# Patient Record
Sex: FEMALE | URBAN - METROPOLITAN AREA
[De-identification: names, ages, dates, MRNs, and addresses within clinical notes are randomized per-mention and may not be internally consistent; named-entity substitution may affect disease eponyms.]

---

## 2020-01-23 ENCOUNTER — APPOINTMENT (RX ONLY)
Dept: URBAN - METROPOLITAN AREA CLINIC 77 | Facility: CLINIC | Age: 65
Setting detail: DERMATOLOGY
End: 2020-01-23

## 2020-01-23 DIAGNOSIS — L98.8 OTHER SPECIFIED DISORDERS OF THE SKIN AND SUBCUTANEOUS TISSUE: ICD-10-CM

## 2020-01-23 DIAGNOSIS — L90.8 OTHER ATROPHIC DISORDERS OF SKIN: ICD-10-CM

## 2020-01-23 PROBLEM — E86.9 VOLUME DEPLETION, UNSPECIFIED: Status: ACTIVE | Noted: 2020-01-23

## 2020-01-23 PROCEDURE — ? SCULPTRA

## 2020-01-23 PROCEDURE — ? ADDITIONAL NOTES

## 2020-01-23 NOTE — HPI: OTHER
Condition:: Facial laxity
Please Describe Your Condition:: Concerns about jowling on lower face
Condition:: Platysmal banding.
Please Describe Your Condition:: Lines on neck

## 2020-01-23 NOTE — PROCEDURE: ADDITIONAL NOTES
Detail Level: Simple
Additional Notes: Discussed plastic surgeon and rama
Additional Notes: Pt wants more juvederm in wrinkles around mouth.

## 2020-01-23 NOTE — PROCEDURE: SCULPTRA
Detail Level: Detailed
Right [facility] Filler Volume In Cc: 0
Show Map Statement?: Yes
Price (Use Numbers Only, No Special Characters Or $): 1200
Reconstitution Date: 1-7-20
Anesthesia Type: 1% lidocaine with 1:100,000 epinephrine
Show Right And Left Zygomatic Arches Volume?: No
Vials Reconstituted (Required For Inventory): 1
Map Statement: See Attached Map for Complete Details.
Post-Care Instructions: Patient instructed to apply ice to reduce swelling.
Consent: Written consent obtained. Risks include but not limited to bruising, beading, irregular texture, ulceration, infection, allergic reaction, scar formation, incomplete augmentation, temporary nature, procedural pain.
Additional Area 1 Location: infraorbital cheeks, right sub malar cheek, right subzygomatic cheek, anterior mandibles, pyriform triangles
Anesthesia Volume In Cc: 3
Additional Area 1 Volume In Cc: 9
Dilution Method: The Sculptra was diluted with 6ml of bacteriostatic sterile water and 3.0 mL of 1% Lidocaine with epinephrine 1:100,000 for a total volume of 9mL for each vial.
Topical Anesthesia?: 20% lidocaine
Volumizer: Sculptra
Treatment Number: 5
Lot #: 
Expiration Date (Month Year): 06/30/22
Injection Technique: The Sculptra was injected to the listed areas after cleansing the skin and providing appropriate anesthesia.

## 2021-02-25 ENCOUNTER — RX ONLY (OUTPATIENT)
Age: 66
Setting detail: RX ONLY
End: 2021-02-25

## 2021-03-16 ENCOUNTER — APPOINTMENT (RX ONLY)
Dept: URBAN - METROPOLITAN AREA CLINIC 77 | Facility: CLINIC | Age: 66
Setting detail: DERMATOLOGY
End: 2021-03-16

## 2021-03-16 DIAGNOSIS — L98.8 OTHER SPECIFIED DISORDERS OF THE SKIN AND SUBCUTANEOUS TISSUE: ICD-10-CM

## 2021-03-16 DIAGNOSIS — Z41.9 ENCOUNTER FOR PROCEDURE FOR PURPOSES OTHER THAN REMEDYING HEALTH STATE, UNSPECIFIED: ICD-10-CM

## 2021-03-16 PROCEDURE — ? JUVEDERM ULTRA XC INJECTION

## 2021-03-16 PROCEDURE — ? OTHER (COSMETIC)

## 2021-03-16 NOTE — PROCEDURE: OTHER (COSMETIC)
Other (Free Text): 1. Discussed [facility] and [place] laser
Detail Level: Simple
Price (Use Numbers Only, No Special Characters Or $): 0

## 2021-03-16 NOTE — PROCEDURE: JUVEDERM ULTRA XC INJECTION
Include Cannula Brand?: DermaSculpt
Use Map Statement For Sites (Optional): Yes
Include Cannula Size?: 27G
Decollete Filler Volume In Cc: 0
Additional Area 1 Volume In Cc: 2
Detail Level: Simple
Expiration Date (Month Year): 02/15/2022
Additional Area 1 Location: chin, lower lip, MLF’s, buccal cheeks
Consent: Written consent obtained. Risks include but not limited to bruising, beading, irregular texture, ulceration, infection, allergic reaction, scar formation, incomplete augmentation, temporary nature, procedural pain.
Procedural Text: The filler was administered to the treatment areas noted above.
Include Cannula Length?: 1.5 inch
Post-Care Instructions: Patient instructed to apply ice to reduce swelling. Discussed signs and symptoms of vascular compromise. Patient was instructed to call the office immediately.
Number Of Syringes (Required For Inventory): 1
Map Statment: See Attach Map for Complete Details
Include Cannula Information In Note?: No
Topical Anesthesia?: 20% lidocaine
Price (Use Numbers Only, No Special Characters Or $): 1041.00
Lot #: [id number]
Filler: Juvederm Ultra XC

## 2021-03-18 ENCOUNTER — APPOINTMENT (RX ONLY)
Dept: URBAN - METROPOLITAN AREA CLINIC 77 | Facility: CLINIC | Age: 66
Setting detail: DERMATOLOGY
End: 2021-03-18

## 2021-03-18 DIAGNOSIS — R23.3 SPONTANEOUS ECCHYMOSES: ICD-10-CM

## 2021-03-18 PROCEDURE — ? COUNSELING

## 2021-03-18 PROCEDURE — ? CYNERGY LASER

## 2021-03-18 ASSESSMENT — LOCATION SIMPLE DESCRIPTION DERM: LOCATION SIMPLE: LEFT CHEEK

## 2021-03-18 ASSESSMENT — LOCATION ZONE DERM: LOCATION ZONE: FACE

## 2021-03-18 ASSESSMENT — LOCATION DETAILED DESCRIPTION DERM: LOCATION DETAILED: LEFT INFERIOR CENTRAL MALAR CHEEK

## 2021-03-18 NOTE — PROCEDURE: CYNERGY LASER
External Cooling Fan Speed: 5
Rep Rate (Hz): single pulse
Cooling: SmartCool
Treatment Number: 1
Fluence In J/Cm2: 4
Price (Use Numbers Only, No Special Characters Or $): 0
Spot Size In Mm: 7
Spot Size In Mm: 10
External Cooling: [facility] Cryo 5
Consent: Written consent obtained, risks reviewed including but not limited to crusting, scabbing, blistering, scarring, darker or lighter pigmentary change, systemic reactions, ulceration, incidental hair removal, bruising, and/or incomplete removal.
Total Pulses: 31
Post-Care Instructions: I reviewed with the patient in detail post-care instructions. Patient should avoid sunlight and wear sun protection.
Pulse Width (Ms): 6
Delay Setting: Short (100ms)
Detail Level: Zone
Pulse Group 1 - PDL 0.5ms/Nd:YAG 15ms
Pulse Width (Ms): 0.3
Spot Size In Mm: 1.5

## 2021-11-22 ENCOUNTER — RX ONLY (OUTPATIENT)
Age: 66
Setting detail: RX ONLY
End: 2021-11-22

## 2022-04-13 ENCOUNTER — APPOINTMENT (RX ONLY)
Dept: URBAN - METROPOLITAN AREA CLINIC 76 | Facility: CLINIC | Age: 67
Setting detail: DERMATOLOGY
End: 2022-04-13

## 2022-11-22 ENCOUNTER — RX ONLY (OUTPATIENT)
Age: 67
Setting detail: RX ONLY
End: 2022-11-22

## 2022-12-01 ENCOUNTER — APPOINTMENT (RX ONLY)
Dept: URBAN - METROPOLITAN AREA CLINIC 76 | Facility: CLINIC | Age: 67
Setting detail: DERMATOLOGY
End: 2022-12-01

## 2022-12-01 DIAGNOSIS — L82.1 OTHER SEBORRHEIC KERATOSIS: ICD-10-CM

## 2022-12-01 PROBLEM — E86.9 VOLUME DEPLETION, UNSPECIFIED: Status: ACTIVE | Noted: 2022-12-01

## 2022-12-01 PROCEDURE — 99212 OFFICE O/P EST SF 10 MIN: CPT

## 2022-12-01 PROCEDURE — ? SCULPTRA

## 2022-12-01 PROCEDURE — ? INVENTORY

## 2022-12-01 PROCEDURE — ? OBSERVATION AND MEASURE

## 2022-12-01 ASSESSMENT — LOCATION ZONE DERM: LOCATION ZONE: FACE

## 2022-12-01 ASSESSMENT — LOCATION SIMPLE DESCRIPTION DERM: LOCATION SIMPLE: RIGHT CHEEK

## 2022-12-01 ASSESSMENT — LOCATION DETAILED DESCRIPTION DERM: LOCATION DETAILED: RIGHT INFERIOR LATERAL MALAR CHEEK

## 2022-12-01 NOTE — PROCEDURE: SCULPTRA
Detail Level: Detailed
Right [facility]mc Filler Volume In Cc: 0
Show Map Statement?: Yes
Reconstitution Date: 11/11/2022 and 11/28/2022
Anesthesia Type: 0.5% lidocaine with 1:200,000 epinephrine
Show Right And Left Zygomatic Arches Volume?: No
Vials Reconstituted (Required For Inventory): 2
Map Statement: See Attached Map for Complete Details.
Post-Care Instructions: Patient instructed to apply ice to reduce swelling.
Consent: Written consent obtained. Risks include but not limited to bruising, beading, irregular texture, ulceration, infection, allergic reaction, scar formation, incomplete augmentation, temporary nature, procedural pain.
Additional Area 1 Location: infraorbital cheeks, buccal and sub malar cheeks, malar cheeks, anterior and posterior mandibles, pillars, mentolabial crease
Additional Area 1 Volume In Cc: 12
Dilution Method: The Sculptra was diluted with 6ml of bacteriostatic sterile water and 3.0 mL of 0.5% Lidocaine with epinephrine 1:200,000 for a total volume of 9mL for each vial.
Topical Anesthesia?: 20% lidocaine
Volumizer: Sculptra
Treatment Number: 6
Injection Technique: The Sculptra was injected to the listed areas after cleansing the skin and providing appropriate anesthesia.
Show Inventory Tab: Show

## 2022-12-01 NOTE — PROCEDURE: MIPS QUALITY
Detail Level: Detailed
Quality 431: Preventive Care And Screening: Unhealthy Alcohol Use - Screening: Patient not identified as an unhealthy alcohol user when screened for unhealthy alcohol use using a systematic screening method
Quality 111:Pneumonia Vaccination Status For Older Adults: Pneumococcal vaccine (PPSV23) administered on or after patient’s 60th birthday and before the end of the measurement period
Quality 226: Preventive Care And Screening: Tobacco Use: Screening And Cessation Intervention: Patient screened for tobacco use and is an ex/non-smoker

## 2022-12-01 NOTE — PROCEDURE: OBSERVATION
Body Location Override (Optional - Billing Will Still Be Based On Selected Body Map Location If Applicable): right buccal cheek
Detail Level: Detailed
Size Of Lesion: [id number]
Instructions (Optional): Instructed patient to schedule for [place] laser with Dr. Lopes or Dr. Engle if she would like removed.\\n\\n**NO CHARGE**

## 2023-03-07 ENCOUNTER — APPOINTMENT (RX ONLY)
Dept: URBAN - METROPOLITAN AREA CLINIC 76 | Facility: CLINIC | Age: 68
Setting detail: DERMATOLOGY
End: 2023-03-07

## 2023-03-07 DIAGNOSIS — L98.8 OTHER SPECIFIED DISORDERS OF THE SKIN AND SUBCUTANEOUS TISSUE: ICD-10-CM

## 2023-03-07 PROCEDURE — ? INVENTORY

## 2023-03-07 PROCEDURE — ? JUVEDERM ULTRA XC INJECTION

## 2023-03-07 NOTE — PROCEDURE: JUVEDERM ULTRA XC INJECTION
Include Cannula Brand?: DermaSculpt
Use Map Statement For Sites (Optional): Yes
Include Cannula Size?: 27G
Decollete Filler Volume In Cc: 0
Additional Area 1 Volume In Cc: 1
Detail Level: Simple
Additional Area 1 Location: MLF’s, rhytides of upper lip
Consent: Written consent obtained. Risks include but not limited to bruising, beading, irregular texture, ulceration, infection, allergic reaction, scar formation, incomplete augmentation, temporary nature, procedural pain.
Procedural Text: The filler was administered to the treatment areas noted above.
Include Cannula Length?: 1.5 inch
Post-Care Instructions: Patient instructed to apply ice to reduce swelling. Discussed signs and symptoms of vascular compromise. Patient was instructed to call the office immediately.
Map Statment: See Attach Map for Complete Details
Include Cannula Information In Note?: No
Anesthesia Type: 1% lidocaine with epinephrine
Topical Anesthesia?: 20% lidocaine
Filler: Juvederm Ultra XC

## 2024-01-25 ENCOUNTER — RX ONLY (OUTPATIENT)
Age: 69
Setting detail: RX ONLY
End: 2024-01-25

## 2024-01-30 ENCOUNTER — APPOINTMENT (RX ONLY)
Dept: URBAN - METROPOLITAN AREA CLINIC 76 | Facility: CLINIC | Age: 69
Setting detail: DERMATOLOGY
End: 2024-01-30

## 2024-01-30 VITALS — SYSTOLIC BLOOD PRESSURE: 132 MMHG | HEART RATE: 62 BPM | DIASTOLIC BLOOD PRESSURE: 80 MMHG

## 2024-01-30 VITALS — SYSTOLIC BLOOD PRESSURE: 128 MMHG | DIASTOLIC BLOOD PRESSURE: 78 MMHG

## 2024-01-30 PROBLEM — E86.9 VOLUME DEPLETION, UNSPECIFIED: Status: ACTIVE | Noted: 2024-01-30

## 2024-01-30 PROCEDURE — ? INVENTORY

## 2024-01-30 PROCEDURE — ? SCULPTRA

## 2024-01-30 NOTE — PROCEDURE: SCULPTRA
Detail Level: Detailed
Right [facility] Filler Volume In Cc: 0
Show Map Statement?: Yes
Reconstitution Date: 1/5/2024 & 1/12/2024
Anesthesia Type: 0.5% lidocaine with 1:200,000 epinephrine
Show Right And Left Zygomatic Arches Volume?: No
Vials Reconstituted (Required For Inventory): 2
Map Statement: See Attached Map for Complete Details.
Post-Care Instructions: Patient instructed to apply ice to reduce swelling.
Consent: Written consent obtained. Risks include but not limited to bruising, beading, irregular texture, ulceration, infection, allergic reaction, scar formation, incomplete augmentation, temporary nature, procedural pain.
Additional Area 1 Location: infraorbital cheeks, buccal and sub malar cheeks, malar cheeks, anterior and posterior mandibles, pillars, mentolabial crease
Additional Area 1 Volume In Cc: 13
Dilution Method: The Sculptra was diluted with 6ml of bacteriostatic sterile water and 3.0 mL of 0.5% Lidocaine with epinephrine 1:200,000 for a total volume of 9mL for each vial.
Topical Anesthesia?: 20% lidocaine
Volumizer: Sculptra
Treatment Number: 7
Injection Technique: The Sculptra was injected to the listed areas after cleansing the skin and providing appropriate anesthesia.
Show Inventory Tab: Show